# Patient Record
Sex: FEMALE | Race: WHITE | NOT HISPANIC OR LATINO | URBAN - METROPOLITAN AREA
[De-identification: names, ages, dates, MRNs, and addresses within clinical notes are randomized per-mention and may not be internally consistent; named-entity substitution may affect disease eponyms.]

---

## 2018-01-07 ENCOUNTER — EMERGENCY (EMERGENCY)
Facility: HOSPITAL | Age: 68
LOS: 1 days | Discharge: HOME | End: 2018-01-07
Attending: EMERGENCY MEDICINE | Admitting: EMERGENCY MEDICINE
Payer: MEDICARE

## 2018-01-07 VITALS
WEIGHT: 160.06 LBS | HEART RATE: 80 BPM | DIASTOLIC BLOOD PRESSURE: 78 MMHG | RESPIRATION RATE: 12 BRPM | SYSTOLIC BLOOD PRESSURE: 122 MMHG | OXYGEN SATURATION: 98 % | TEMPERATURE: 98 F

## 2018-01-07 VITALS
SYSTOLIC BLOOD PRESSURE: 124 MMHG | TEMPERATURE: 98 F | OXYGEN SATURATION: 99 % | HEART RATE: 79 BPM | DIASTOLIC BLOOD PRESSURE: 79 MMHG | RESPIRATION RATE: 12 BRPM

## 2018-01-07 PROCEDURE — 29515 APPLICATION SHORT LEG SPLINT: CPT

## 2018-01-07 PROCEDURE — 99284 EMERGENCY DEPT VISIT MOD MDM: CPT | Mod: 25

## 2018-01-07 PROCEDURE — 93971 EXTREMITY STUDY: CPT | Mod: 26,RT

## 2018-01-07 PROCEDURE — 29515 APPLICATION SHORT LEG SPLINT: CPT | Mod: RT

## 2018-01-07 PROCEDURE — 73610 X-RAY EXAM OF ANKLE: CPT | Mod: 26,RT

## 2018-01-07 PROCEDURE — 73630 X-RAY EXAM OF FOOT: CPT | Mod: 26,RT

## 2018-01-07 PROCEDURE — 93971 EXTREMITY STUDY: CPT

## 2018-01-07 PROCEDURE — 73630 X-RAY EXAM OF FOOT: CPT

## 2018-01-07 PROCEDURE — 99283 EMERGENCY DEPT VISIT LOW MDM: CPT | Mod: 25

## 2018-01-07 PROCEDURE — 73610 X-RAY EXAM OF ANKLE: CPT

## 2018-01-07 RX ORDER — OXYCODONE AND ACETAMINOPHEN 5; 325 MG/1; MG/1
1 TABLET ORAL ONCE
Qty: 0 | Refills: 0 | Status: DISCONTINUED | OUTPATIENT
Start: 2018-01-07 | End: 2018-01-07

## 2018-01-07 NOTE — ED PROVIDER NOTE - MUSCULOSKELETAL, MLM
Right ankle: TTP lateral malleolus with increased swelling and mild contusion sensation intact +pedal pulse cap refill less then 2 seconds, non-pitting edema noted to right calf FROM knee

## 2018-01-07 NOTE — ED PROVIDER NOTE - OBJECTIVE STATEMENT
Pt is a 68 yo female who presents to the ED with a cc of right ankle pain.  No pertinent past medical history.  Pt reports that she was recently out of the country on vacation and just flew in this morning.  While she was walking in the airport to take her initial flight home she slipped on wet tile and fell to the ground, landing on her right ankle.  She then reports that her luggage fell onto of her right ankle.  She has been experiencing pain and swelling to the lateral aspect and calf since.  She has been able to ambulate but with severe pain and has needed a cane fr support.  While in the airport pt used a wheelchair.  Denies prior injury to ankle.  Denies numbness or tingling in ext.  Pt is on ASA but no other blood thinners.  Pt reports that she did not hit her head during the fall and there was no LOC.  Denies all other injuries.

## 2018-01-07 NOTE — ED PROVIDER NOTE - CARE PLAN
Principal Discharge DX:	Closed fracture of right ankle, initial encounter  Instructions for follow-up, activity and diet:	Return to the ED for any new or worsening symptoms  Take your medication as prescribed  Percocet  Percocet per label instructions as needed for  Secondary Diagnosis:	Ankle pain, right Principal Discharge DX:	Closed fracture of right ankle, initial encounter  Instructions for follow-up, activity and diet:	Return to the ED for any new or worsening symptoms  Take your medication as prescribed  Percocet per label instructions as needed for moderate to severe pain do not drive when taking  Elevate your leg to reduce swelling   Ice to affected ankle on 20 min off 40 min to reduce pain and swelling  Non-weight bearing until cleared by orthopedics   Crutches as shown  Call the orthopedist tomorrow to schedule follow up   Keep splint in place until cleared by orthopedics   Advance activity as tolerated  Secondary Diagnosis:	Ankle pain, right

## 2018-01-07 NOTE — ED ADULT NURSE NOTE - OBJECTIVE STATEMENT
patient with complain of right ankle pain, s/p fall on 1/5/2017 before boarding a 30 hour flight, pt also reports luggage falling on top of right ankle, pt with moderate swelling, +pedal pulses, +sensation, warm and tender to touch, partial weight bearing, will continue to monitor.

## 2018-01-07 NOTE — ED ADULT TRIAGE NOTE - CHIEF COMPLAINT QUOTE
patient with right ankle pain s/p fall 1/5/2018 denies head trauma or LOC, pt then took a 30 hour flight

## 2018-01-07 NOTE — ED PROVIDER NOTE - PLAN OF CARE
Return to the ED for any new or worsening symptoms  Take your medication as prescribed  Percocet  Percocet per label instructions as needed for Return to the ED for any new or worsening symptoms  Take your medication as prescribed  Percocet per label instructions as needed for moderate to severe pain do not drive when taking  Elevate your leg to reduce swelling   Ice to affected ankle on 20 min off 40 min to reduce pain and swelling  Non-weight bearing until cleared by orthopedics   Crutches as shown  Call the orthopedist tomorrow to schedule follow up   Keep splint in place until cleared by orthopedics   Advance activity as tolerated

## 2018-01-07 NOTE — ED PROVIDER NOTE - PROGRESS NOTE DETAILS
X-rays reviewed with orthopedics, pt can be placed in splint non-weight bearing.  Reviewed with pt all questions answered. No DVT noted.  Will discharge home in splint, non-weight bearing with orthopedic follow up

## 2019-09-03 ENCOUNTER — FORM ENCOUNTER (OUTPATIENT)
Age: 69
End: 2019-09-03

## 2021-09-10 NOTE — ED ADULT NURSE NOTE - NSHISCREENINGQ1_ED_A_ED
FUTURE VISIT INFORMATION      SURGERY INFORMATION:    Date: 21    Location: [facility]    Surgeon:  Zackery Moura MD    Anesthesia Type:  general    Procedure: NEPHROLITHOTOMY, PERCUTANEOUS, USING HOLMIUM LASER    Consult: virtual visit     RECORDS REQUESTED FROM:       Primary Care Provider: Cristhian Busch MD- [facility]    Pertinent Medical History: hypertension     Most recent EKG+ Tracin21    Most recent ECHO: 21    Most recent Cardiac Stress Test: 11-[facility]    Most recent PFT's: 20- [facility]       No

## 2022-01-27 DIAGNOSIS — Z87.19 PERSONAL HISTORY OF OTHER DISEASES OF THE DIGESTIVE SYSTEM: ICD-10-CM

## 2022-01-27 DIAGNOSIS — N63.20 UNSPECIFIED LUMP IN THE LEFT BREAST, UNSPECIFIED QUADRANT: ICD-10-CM

## 2022-01-27 DIAGNOSIS — Z86.39 PERSONAL HISTORY OF OTHER ENDOCRINE, NUTRITIONAL AND METABOLIC DISEASE: ICD-10-CM

## 2022-01-27 DIAGNOSIS — Z80.8 FAMILY HISTORY OF MALIGNANT NEOPLASM OF OTHER ORGANS OR SYSTEMS: ICD-10-CM

## 2022-01-27 DIAGNOSIS — Z86.79 PERSONAL HISTORY OF OTHER DISEASES OF THE CIRCULATORY SYSTEM: ICD-10-CM

## 2022-01-27 DIAGNOSIS — Z78.9 OTHER SPECIFIED HEALTH STATUS: ICD-10-CM

## 2022-01-28 ENCOUNTER — APPOINTMENT (OUTPATIENT)
Dept: BREAST CENTER | Facility: CLINIC | Age: 72
End: 2022-01-28
Payer: MEDICARE

## 2022-01-28 DIAGNOSIS — N60.11 DIFFUSE CYSTIC MASTOPATHY OF LEFT BREAST: ICD-10-CM

## 2022-01-28 DIAGNOSIS — N60.12 DIFFUSE CYSTIC MASTOPATHY OF LEFT BREAST: ICD-10-CM

## 2022-01-28 DIAGNOSIS — R92.8 OTHER ABNORMAL AND INCONCLUSIVE FINDINGS ON DIAGNOSTIC IMAGING OF BREAST: ICD-10-CM

## 2022-01-28 PROCEDURE — 99213 OFFICE O/P EST LOW 20 MIN: CPT

## 2022-01-28 NOTE — REASON FOR VISIT
[Follow-Up: _____] : a [unfilled] follow-up visit [FreeTextEntry1] : The patient comes in with a history of fibrocystic mastopathy and underwent a right breast core biopsy in May 2008 for calcifications just showing sclerosing adenosis.

## 2022-01-28 NOTE — PHYSICAL EXAM
[Normocephalic] : normocephalic [Atraumatic] : atraumatic [EOMI] : extra ocular movement intact [Supple] : supple [No Supraclavicular Adenopathy] : no supraclavicular adenopathy [No Cervical Adenopathy] : no cervical adenopathy [Examined in the supine and seated position] : examined in the supine and seated position [No dominant masses] : no dominant masses in right breast  [No dominant masses] : no dominant masses left breast [No Nipple Retraction] : no left nipple retraction [No Nipple Discharge] : no left nipple discharge [Breast Mass Right Breast ___cm] : no masses [Breast Mass Left Breast ___cm] : no masses [Breast Nipple Inversion] : nipples not inverted [Breast Nipple Retraction] : nipples not retracted [Breast Nipple Flattening] : nipples not flattened [Breast Nipple Fissures] : nipples not fissured [Breast Abnormal Lactation (Galactorrhea)] : no galactorrhea [Breast Abnormal Secretion Bloody Fluid] : no bloody discharge [Breast Abnormal Secretion Serous Fluid] : no serous discharge [Breast Abnormal Secretion Opalescent Fluid] : no milky discharge [No Axillary Lymphadenopathy] : no left axillary lymphadenopathy [No Edema] : no edema [No Rashes] : no rashes [No Ulceration] : no ulceration [de-identified] : On exam today, the patient has ptotic D-cup breasts.  On palpation, I cannot feel any suspicious densities in either breast.  She has no axillary, supraclavicular, or cervical adenopathy.

## 2022-01-28 NOTE — REVIEW OF SYSTEMS
[Loss Of Hearing] : hearing loss [Shortness Of Breath] : shortness of breath [Heartburn] : heartburn [Arthralgias] : arthralgias [Joint Stiffness] : joint stiffness [Breast Pain] : breast pain [Negative] : Heme/Lymph

## 2022-01-28 NOTE — HISTORY OF PRESENT ILLNESS
[FreeTextEntry1] : The patient is a 71-year-old G2, P2 postmenopausal white female.  She had her first child at age 22 and never took any hormone replacement therapy.  She has no family history of breast or ovarian cancer.  She underwent a right breast biopsy for calcifications in the retroareolar region in May 2008 which showed sclerosing adenosis.  She comes in for an interval follow-up follow-up and has been getting yearly mammography and ultrasound.

## 2022-01-28 NOTE — ASSESSMENT
[FreeTextEntry1] : The patient is a 71-year-old G2, P2 postmenopausal white female.  She had her first child at age 22 and never took any hormone replacement therapy.  She has no family history of breast or ovarian cancer.  She underwent a right breast biopsy for calcifications in the retroareolar region in May 2008 which showed sclerosing adenosis.  The patient underwent her last bilateral mammography and ultrasound which was reviewed from January 20, 2022 performed at [facility] which showed some stable calcifications and nodularity on mammography with ultrasound showing probable scarring the right breast 8:00 region with a stable left breast 12:00 nodule measuring 5 mm and stable left breast 9:00 nodules however, follow-up diagnostic bilateral ultrasound is being recommended.  On exam today, I cannot feel any suspicious densities in either breast. The patient was reassured and given her negative clinical exam she just get a follow-up diagnostic bilateral ultrasound in 6 months around July 2022 to assure stability of these bilateral findings and she was given prescriptions.  She would like to have those studies done here at [facility] and to follow-up again at that time in 6 months.  If the follow-up diagnostic ultrasound is unchanged she should get her next bilateral mammography and ultrasound again in January 2023.

## 2022-08-15 ENCOUNTER — APPOINTMENT (OUTPATIENT)
Dept: BREAST CENTER | Facility: CLINIC | Age: 72
End: 2022-08-15

## 2023-01-27 ENCOUNTER — APPOINTMENT (OUTPATIENT)
Dept: BREAST CENTER | Facility: CLINIC | Age: 73
End: 2023-01-27

## 2024-02-23 ENCOUNTER — NON-APPOINTMENT (OUTPATIENT)
Age: 74
End: 2024-02-23

## 2024-04-17 ENCOUNTER — NON-APPOINTMENT (OUTPATIENT)
Age: 74
End: 2024-04-17

## 2024-04-17 NOTE — HISTORY OF PRESENT ILLNESS
[FreeTextEntry1] : The patient is a 73-year-old G2, P2 postmenopausal white female.  She had her first child at age 22 and never took any hormone replacement therapy.  She has no family history of breast or ovarian cancer.  She underwent a right breast biopsy for calcifications in the retroareolar region in May 2008 which showed sclerosing adenosis.  She comes in for an interval follow-up follow-up and has been getting yearly mammography and ultrasound.

## 2024-04-17 NOTE — PAST MEDICAL HISTORY
[Postmenopausal] : The patient is postmenopausal [Menarche Age ____] : age at menarche was [unfilled] [Menopause Age____] : age at menopause was [unfilled] [Definite ___ (Date)] : the last menstrual period was [unfilled] [Total Preg ___] : G[unfilled] [Live Births ___] : P[unfilled]  [Age At Live Birth ___] : Age at live birth: [unfilled] [History of Hormone Replacement Treatment] : has no history of hormone replacement treatment

## 2024-04-17 NOTE — PHYSICAL EXAM
[Atraumatic] : atraumatic [Normocephalic] : normocephalic [EOMI] : extra ocular movement intact [Supple] : supple [No Cervical Adenopathy] : no cervical adenopathy [No Supraclavicular Adenopathy] : no supraclavicular adenopathy [Examined in the supine and seated position] : examined in the supine and seated position [No dominant masses] : no dominant masses in right breast  [No dominant masses] : no dominant masses left breast [No Nipple Retraction] : no left nipple retraction [No Nipple Discharge] : no left nipple discharge [Breast Mass Right Breast ___cm] : no masses [Breast Mass Left Breast ___cm] : no masses [No Axillary Lymphadenopathy] : no left axillary lymphadenopathy [No Edema] : no edema [No Rashes] : no rashes [No Ulceration] : no ulceration [Breast Nipple Inversion] : nipples not inverted [Breast Nipple Retraction] : nipples not retracted [Breast Nipple Flattening] : nipples not flattened [Breast Nipple Fissures] : nipples not fissured [Breast Abnormal Lactation (Galactorrhea)] : no galactorrhea [Breast Abnormal Secretion Bloody Fluid] : no bloody discharge [Breast Abnormal Secretion Serous Fluid] : no serous discharge [Breast Abnormal Secretion Opalescent Fluid] : no milky discharge [de-identified] : On exam today, the patient has ptotic D-cup breasts.  On palpation, I cannot feel any suspicious densities in either breast.  She has no axillary, supraclavicular, or cervical adenopathy.

## 2024-04-17 NOTE — PHYSICAL EXAM
[Normocephalic] : normocephalic [Atraumatic] : atraumatic [EOMI] : extra ocular movement intact [Supple] : supple [No Supraclavicular Adenopathy] : no supraclavicular adenopathy [No Cervical Adenopathy] : no cervical adenopathy [Examined in the supine and seated position] : examined in the supine and seated position [No dominant masses] : no dominant masses in right breast  [No dominant masses] : no dominant masses left breast [No Nipple Retraction] : no left nipple retraction [No Nipple Discharge] : no left nipple discharge [Breast Mass Right Breast ___cm] : no masses [Breast Mass Left Breast ___cm] : no masses [No Axillary Lymphadenopathy] : no left axillary lymphadenopathy [No Edema] : no edema [No Rashes] : no rashes [No Ulceration] : no ulceration [Breast Nipple Inversion] : nipples not inverted [Breast Nipple Retraction] : nipples not retracted [Breast Nipple Flattening] : nipples not flattened [Breast Nipple Fissures] : nipples not fissured [Breast Abnormal Lactation (Galactorrhea)] : no galactorrhea [Breast Abnormal Secretion Bloody Fluid] : no bloody discharge [Breast Abnormal Secretion Serous Fluid] : no serous discharge [Breast Abnormal Secretion Opalescent Fluid] : no milky discharge [de-identified] : On exam today, the patient has ptotic D-cup breasts.  On palpation, I cannot feel any suspicious densities in either breast.  She has no axillary, supraclavicular, or cervical adenopathy.

## 2024-04-17 NOTE — ASSESSMENT
[FreeTextEntry1] : The patient is a 72-year-old G2, P2 postmenopausal white female.  She had her first child at age 22 and never took any hormone replacement therapy.  She has no family history of breast or ovarian cancer.  She underwent a right breast biopsy for calcifications in the retroareolar region in May 2008 which showed sclerosing adenosis.  The patient underwent her last bilateral mammography and ultrasound which was reviewed from January 20, 2022 performed at [facility] which showed some stable calcifications and nodularity on mammography with ultrasound showing probable scarring the right breast 8:00 region with a stable left breast 12:00 nodule measuring 5 mm and stable left breast 9:00 nodules however, follow-up diagnostic bilateral ultrasound was being recommended.  The diagnostic bilateral ultrasound was performed and reviewed from ??????  On exam today, I cannot feel any suspicious densities in either breast. The patient was reassured and given her negative clinical exam she can just go back to yearly follow-up and yearly mammography and ultrasound again.  She can follow-up again in 1 year in August 2023.  She would like to have her future studies done here at [facility] and she should get her next bilateral mammography and ultrasound again in January 2023 and she was given prescriptions.

## 2024-04-17 NOTE — HISTORY OF PRESENT ILLNESS
[FreeTextEntry1] : The patient is a 72-year-old G2, P2 postmenopausal white female.  She had her first child at age 22 and never took any hormone replacement therapy.  She has no family history of breast or ovarian cancer.  She underwent a right breast biopsy for calcifications in the retroareolar region in May 2008 which showed sclerosing adenosis.  She comes in for an interval follow-up follow-up and has been getting yearly mammography and ultrasound.

## 2024-04-17 NOTE — PAST MEDICAL HISTORY
[Postmenopausal] : The patient is postmenopausal [Menarche Age ____] : age at menarche was [unfilled] [Menopause Age____] : age at menopause was [unfilled] [Definite ___ (Date)] : the last menstrual period was [unfilled] [Total Preg ___] : G[unfilled] [Age At Live Birth ___] : Age at live birth: [unfilled] [Live Births ___] : P[unfilled]  [History of Hormone Replacement Treatment] : has no history of hormone replacement treatment

## 2024-04-17 NOTE — ASSESSMENT
[FreeTextEntry1] : The patient is a 73-year-old G2, P2 postmenopausal white female.  She had her first child at age 22 and never took any hormone replacement therapy.  She has no family history of breast or ovarian cancer.  She underwent a right breast biopsy for calcifications in the retroareolar region in May 2008 which showed sclerosing adenosis.  The patient underwent her last bilateral mammography and ultrasound which was reviewed from ??????? January 20, 2022 performed at [facility] which showed some stable calcifications and nodularity on mammography with ultrasound showing probable scarring in the right breast 8:00 region with a stable left breast 12:00 nodule measuring 5 mm and stable left breast 9:00 nodules. A diagnostic bilateral ultrasound was performed and reviewed from July 29, 2022 performed in [place] just showing stable benign calcified nodules. On exam today, I cannot feel any suspicious densities in either breast. The patient was reassured and given her negative clinical exam she can just continue yearly follow-up and yearly mammography and ultrasound again.  She can follow-up again in 1 year in March 2025.  She would like to have her future studies done here at [facility] ?????? and she should get her next bilateral mammography and ultrasound again in ?????? January 2025 and she was given prescriptions.

## 2024-05-11 NOTE — ASSESSMENT
[FreeTextEntry1] : The patient is a 73-year-old G2, P2 postmenopausal white female.  She had her first child at age 22 and never took any hormone replacement therapy.  She has no family history of breast or ovarian cancer.  She underwent a right breast biopsy for calcifications in the retroareolar region in May 2008 which showed sclerosing adenosis.  The patient underwent her last bilateral mammography and ultrasound which was reviewed from ??????? January 20, 2022 performed at [facility] which showed some stable calcifications and nodularity on mammography with ultrasound showing probable scarring in the right breast 8:00 region with a stable left breast 12:00 nodule measuring 5 mm and stable left breast 9:00 nodules. A diagnostic bilateral ultrasound was performed and reviewed from July 29, 2022 performed in [place] just showing stable benign calcified nodules. On exam today, I cannot feel any suspicious densities in either breast. The patient was reassured and given her negative clinical exam she can just continue yearly follow-up and yearly mammography and ultrasound.  She can follow-up again in May 2025.  She would like to have her future studies done here at [facility] ?????? and she will be due for her next bilateral mammography and ultrasound again in ?????? January 2025 and she was given prescriptions.

## 2024-05-11 NOTE — PHYSICAL EXAM
[Normocephalic] : normocephalic [Atraumatic] : atraumatic [EOMI] : extra ocular movement intact [Supple] : supple [No Supraclavicular Adenopathy] : no supraclavicular adenopathy [No Cervical Adenopathy] : no cervical adenopathy [Examined in the supine and seated position] : examined in the supine and seated position [No dominant masses] : no dominant masses in right breast  [No dominant masses] : no dominant masses left breast [No Nipple Retraction] : no left nipple retraction [No Nipple Discharge] : no left nipple discharge [Breast Mass Right Breast ___cm] : no masses [Breast Mass Left Breast ___cm] : no masses [Breast Nipple Inversion] : nipples not inverted [Breast Nipple Retraction] : nipples not retracted [Breast Nipple Flattening] : nipples not flattened [Breast Nipple Fissures] : nipples not fissured [Breast Abnormal Lactation (Galactorrhea)] : no galactorrhea [Breast Abnormal Secretion Bloody Fluid] : no bloody discharge [Breast Abnormal Secretion Serous Fluid] : no serous discharge [Breast Abnormal Secretion Opalescent Fluid] : no milky discharge [No Axillary Lymphadenopathy] : no left axillary lymphadenopathy [No Edema] : no edema [No Rashes] : no rashes [No Ulceration] : no ulceration [de-identified] : On exam today, the patient has ptotic D-cup breasts.  On palpation, I cannot feel any suspicious densities in either breast.  She has no axillary, supraclavicular, or cervical adenopathy.

## 2024-05-11 NOTE — ASSESSMENT
[FreeTextEntry1] : The patient is a 73-year-old G2, P2 postmenopausal white female.  She had her first child at age 22 and never took any hormone replacement therapy.  She has no family history of breast or ovarian cancer.  She underwent a right breast biopsy for calcifications in the retroareolar region in May 2008 which showed sclerosing adenosis.  The patient underwent her last bilateral mammography and ultrasound which was reviewed from ??????? January 20, 2022 performed at [facility] which showed some stable calcifications and nodularity on mammography with ultrasound showing probable scarring in the right breast 8:00 region with a stable left breast 12:00 nodule measuring 5 mm and stable left breast 9:00 nodules. A diagnostic bilateral ultrasound was performed and reviewed from July 29, 2022 performed in [place] just showing stable benign calcified nodules. On exam today, I cannot feel any suspicious densities in either breast. The patient was reassured and given her negative clinical exam she can just continue yearly follow-up and yearly mammography and ultrasound.  She can follow-up again in May 2025.  She would like to have her future studies done here at [facility] ?????? and she will be due for her next bilateral mammography and ultrasound again in ?????? January 2025 and she was given prescriptions.

## 2024-05-11 NOTE — PHYSICAL EXAM
[Normocephalic] : normocephalic [Atraumatic] : atraumatic [EOMI] : extra ocular movement intact [Supple] : supple [No Supraclavicular Adenopathy] : no supraclavicular adenopathy [No Cervical Adenopathy] : no cervical adenopathy [Examined in the supine and seated position] : examined in the supine and seated position [No dominant masses] : no dominant masses in right breast  [No dominant masses] : no dominant masses left breast [No Nipple Retraction] : no left nipple retraction [No Nipple Discharge] : no left nipple discharge [Breast Mass Right Breast ___cm] : no masses [Breast Mass Left Breast ___cm] : no masses [No Axillary Lymphadenopathy] : no left axillary lymphadenopathy [No Edema] : no edema [No Rashes] : no rashes [No Ulceration] : no ulceration [Breast Nipple Inversion] : nipples not inverted [Breast Nipple Retraction] : nipples not retracted [Breast Nipple Fissures] : nipples not fissured [Breast Nipple Flattening] : nipples not flattened [Breast Abnormal Lactation (Galactorrhea)] : no galactorrhea [Breast Abnormal Secretion Bloody Fluid] : no bloody discharge [Breast Abnormal Secretion Serous Fluid] : no serous discharge [de-identified] : On exam today, the patient has ptotic D-cup breasts.  On palpation, I cannot feel any suspicious densities in either breast.  She has no axillary, supraclavicular, or cervical adenopathy. [Breast Abnormal Secretion Opalescent Fluid] : no milky discharge

## 2024-05-15 ENCOUNTER — APPOINTMENT (OUTPATIENT)
Dept: BREAST CENTER | Facility: CLINIC | Age: 74
End: 2024-05-15